# Patient Record
Sex: FEMALE | Race: WHITE | NOT HISPANIC OR LATINO | Employment: FULL TIME | URBAN - METROPOLITAN AREA
[De-identification: names, ages, dates, MRNs, and addresses within clinical notes are randomized per-mention and may not be internally consistent; named-entity substitution may affect disease eponyms.]

---

## 2023-02-04 PROBLEM — E11.9 CONTROLLED TYPE 2 DIABETES MELLITUS (MULTI): Status: ACTIVE | Noted: 2023-02-04

## 2023-02-04 PROBLEM — G47.00 INSOMNIA: Status: ACTIVE | Noted: 2023-02-04

## 2023-02-04 PROBLEM — K14.0 GLOSSITIS: Status: ACTIVE | Noted: 2023-02-04

## 2023-02-04 PROBLEM — G62.9 NEUROPATHY: Status: ACTIVE | Noted: 2023-02-04

## 2023-02-04 PROBLEM — N39.0 UTI (URINARY TRACT INFECTION): Status: ACTIVE | Noted: 2023-02-04

## 2023-02-04 PROBLEM — R78.81 BACTEREMIA: Status: ACTIVE | Noted: 2023-02-04

## 2023-02-04 PROBLEM — I10 HYPERTENSION: Status: ACTIVE | Noted: 2023-02-04

## 2023-02-04 PROBLEM — N92.6 MISSED PERIODS: Status: ACTIVE | Noted: 2023-02-04

## 2023-02-04 PROBLEM — R10.31 ABDOMINAL PAIN, RIGHT LOWER QUADRANT: Status: ACTIVE | Noted: 2023-02-04

## 2023-02-04 PROBLEM — E78.5 HYPERLIPIDEMIA: Status: ACTIVE | Noted: 2023-02-04

## 2023-02-04 PROBLEM — F41.9 ANXIETY: Status: ACTIVE | Noted: 2023-02-04

## 2023-02-04 PROBLEM — R51.9 FREQUENT HEADACHES: Status: ACTIVE | Noted: 2023-02-04

## 2023-02-04 PROBLEM — B37.31 YEAST VAGINITIS: Status: ACTIVE | Noted: 2023-02-04

## 2023-02-04 PROBLEM — G43.909 MIGRAINE, UNSPECIFIED, NOT INTRACTABLE, WITHOUT STATUS MIGRAINOSUS: Status: ACTIVE | Noted: 2023-02-04

## 2023-02-04 PROBLEM — N20.0 KIDNEY STONE ON LEFT SIDE: Status: ACTIVE | Noted: 2023-02-04

## 2023-02-04 PROBLEM — R07.9 CHEST PAIN: Status: ACTIVE | Noted: 2023-02-04

## 2023-02-04 PROBLEM — R11.0 NAUSEA IN ADULT: Status: ACTIVE | Noted: 2023-02-04

## 2023-02-04 PROBLEM — R10.9 ACUTE RIGHT FLANK PAIN: Status: ACTIVE | Noted: 2023-02-04

## 2023-02-04 PROBLEM — F32.A DEPRESSION: Status: ACTIVE | Noted: 2023-02-04

## 2023-02-04 PROBLEM — R30.0 DYSURIA: Status: ACTIVE | Noted: 2023-02-04

## 2023-02-04 RX ORDER — ONDANSETRON HYDROCHLORIDE 8 MG/1
1 TABLET, FILM COATED ORAL EVERY 8 HOURS PRN
COMMUNITY
Start: 2022-04-08

## 2023-02-04 RX ORDER — LOSARTAN POTASSIUM 25 MG/1
1 TABLET ORAL DAILY
COMMUNITY
Start: 2022-09-13

## 2023-02-04 RX ORDER — DICLOFENAC SODIUM 75 MG/1
1 TABLET, DELAYED RELEASE ORAL 2 TIMES DAILY PRN
COMMUNITY
Start: 2022-10-18

## 2023-02-04 RX ORDER — ATORVASTATIN CALCIUM 10 MG/1
1 TABLET, FILM COATED ORAL DAILY
COMMUNITY
Start: 2022-09-14 | End: 2023-04-03 | Stop reason: ALTCHOICE

## 2023-02-04 RX ORDER — NAPROXEN 500 MG/1
1 TABLET ORAL 2 TIMES DAILY PRN
COMMUNITY
Start: 2022-04-08

## 2023-02-04 RX ORDER — TAMSULOSIN HYDROCHLORIDE 0.4 MG/1
1 CAPSULE ORAL DAILY
COMMUNITY
Start: 2022-10-29

## 2023-02-04 RX ORDER — DULAGLUTIDE 1.5 MG/.5ML
1.5 INJECTION, SOLUTION SUBCUTANEOUS
COMMUNITY
Start: 2022-10-11 | End: 2023-09-25 | Stop reason: SDUPTHER

## 2023-02-04 RX ORDER — GABAPENTIN 300 MG/1
1 CAPSULE ORAL NIGHTLY
COMMUNITY
Start: 2022-09-13 | End: 2023-04-03 | Stop reason: ALTCHOICE

## 2023-02-04 RX ORDER — AMITRIPTYLINE HYDROCHLORIDE 10 MG/1
1 TABLET, FILM COATED ORAL DAILY
COMMUNITY
Start: 2022-11-04

## 2023-02-04 RX ORDER — PHENAZOPYRIDINE HYDROCHLORIDE 100 MG/1
1 TABLET, FILM COATED ORAL 2 TIMES DAILY
COMMUNITY
Start: 2022-10-29

## 2023-02-04 RX ORDER — HYDROXYCHLOROQUINE SULFATE 200 MG/1
1 TABLET, FILM COATED ORAL 2 TIMES DAILY
COMMUNITY
Start: 2022-10-05

## 2023-04-03 ENCOUNTER — LAB (OUTPATIENT)
Dept: LAB | Facility: LAB | Age: 48
End: 2023-04-03
Payer: COMMERCIAL

## 2023-04-03 ENCOUNTER — OFFICE VISIT (OUTPATIENT)
Dept: PRIMARY CARE | Facility: CLINIC | Age: 48
End: 2023-04-03
Payer: COMMERCIAL

## 2023-04-03 VITALS
HEIGHT: 66 IN | SYSTOLIC BLOOD PRESSURE: 124 MMHG | DIASTOLIC BLOOD PRESSURE: 84 MMHG | BODY MASS INDEX: 34.72 KG/M2 | WEIGHT: 216 LBS | HEART RATE: 98 BPM | OXYGEN SATURATION: 99 %

## 2023-04-03 DIAGNOSIS — I10 HYPERTENSION, UNSPECIFIED TYPE: ICD-10-CM

## 2023-04-03 DIAGNOSIS — E78.5 HYPERLIPIDEMIA, UNSPECIFIED HYPERLIPIDEMIA TYPE: ICD-10-CM

## 2023-04-03 DIAGNOSIS — E11.9 CONTROLLED TYPE 2 DIABETES MELLITUS WITHOUT COMPLICATION, WITHOUT LONG-TERM CURRENT USE OF INSULIN (MULTI): Primary | ICD-10-CM

## 2023-04-03 DIAGNOSIS — K75.81 NASH (NONALCOHOLIC STEATOHEPATITIS): ICD-10-CM

## 2023-04-03 PROBLEM — G43.909 MIGRAINE, UNSPECIFIED, NOT INTRACTABLE, WITHOUT STATUS MIGRAINOSUS: Status: RESOLVED | Noted: 2023-02-04 | Resolved: 2023-04-03

## 2023-04-03 PROBLEM — F41.9 ANXIETY: Status: RESOLVED | Noted: 2023-02-04 | Resolved: 2023-04-03

## 2023-04-03 PROBLEM — K14.0 GLOSSITIS: Status: RESOLVED | Noted: 2023-02-04 | Resolved: 2023-04-03

## 2023-04-03 PROBLEM — R51.9 FREQUENT HEADACHES: Status: RESOLVED | Noted: 2023-02-04 | Resolved: 2023-04-03

## 2023-04-03 PROBLEM — F32.A DEPRESSION: Status: RESOLVED | Noted: 2023-02-04 | Resolved: 2023-04-03

## 2023-04-03 PROBLEM — R30.0 DYSURIA: Status: RESOLVED | Noted: 2023-02-04 | Resolved: 2023-04-03

## 2023-04-03 PROBLEM — R11.0 NAUSEA IN ADULT: Status: RESOLVED | Noted: 2023-02-04 | Resolved: 2023-04-03

## 2023-04-03 PROBLEM — R78.81 BACTEREMIA: Status: RESOLVED | Noted: 2023-02-04 | Resolved: 2023-04-03

## 2023-04-03 PROBLEM — R07.9 CHEST PAIN: Status: RESOLVED | Noted: 2023-02-04 | Resolved: 2023-04-03

## 2023-04-03 PROBLEM — R10.31 ABDOMINAL PAIN, RIGHT LOWER QUADRANT: Status: RESOLVED | Noted: 2023-02-04 | Resolved: 2023-04-03

## 2023-04-03 PROBLEM — N92.6 MISSED PERIODS: Status: RESOLVED | Noted: 2023-02-04 | Resolved: 2023-04-03

## 2023-04-03 PROBLEM — R10.9 ACUTE RIGHT FLANK PAIN: Status: RESOLVED | Noted: 2023-02-04 | Resolved: 2023-04-03

## 2023-04-03 PROBLEM — G62.9 NEUROPATHY: Status: RESOLVED | Noted: 2023-02-04 | Resolved: 2023-04-03

## 2023-04-03 LAB
ALANINE AMINOTRANSFERASE (SGPT) (U/L) IN SER/PLAS: 5 U/L (ref 7–45)
ALBUMIN (G/DL) IN SER/PLAS: 4.2 G/DL (ref 3.4–5)
ALKALINE PHOSPHATASE (U/L) IN SER/PLAS: 73 U/L (ref 33–110)
ANION GAP IN SER/PLAS: 13 MMOL/L (ref 10–20)
ASPARTATE AMINOTRANSFERASE (SGOT) (U/L) IN SER/PLAS: 10 U/L (ref 9–39)
BILIRUBIN TOTAL (MG/DL) IN SER/PLAS: 0.6 MG/DL (ref 0–1.2)
CALCIUM (MG/DL) IN SER/PLAS: 9.7 MG/DL (ref 8.6–10.6)
CARBON DIOXIDE, TOTAL (MMOL/L) IN SER/PLAS: 28 MMOL/L (ref 21–32)
CHLORIDE (MMOL/L) IN SER/PLAS: 103 MMOL/L (ref 98–107)
CREATININE (MG/DL) IN SER/PLAS: 0.89 MG/DL (ref 0.5–1.05)
ERYTHROCYTE DISTRIBUTION WIDTH (RATIO) BY AUTOMATED COUNT: 13.1 % (ref 11.5–14.5)
ERYTHROCYTE MEAN CORPUSCULAR HEMOGLOBIN CONCENTRATION (G/DL) BY AUTOMATED: 34.1 G/DL (ref 32–36)
ERYTHROCYTE MEAN CORPUSCULAR VOLUME (FL) BY AUTOMATED COUNT: 93 FL (ref 80–100)
ERYTHROCYTES (10*6/UL) IN BLOOD BY AUTOMATED COUNT: 4.56 X10E12/L (ref 4–5.2)
GFR FEMALE: 80 ML/MIN/1.73M2
GLUCOSE (MG/DL) IN SER/PLAS: 140 MG/DL (ref 74–99)
HEMATOCRIT (%) IN BLOOD BY AUTOMATED COUNT: 42.2 % (ref 36–46)
HEMOGLOBIN (G/DL) IN BLOOD: 14.4 G/DL (ref 12–16)
LEUKOCYTES (10*3/UL) IN BLOOD BY AUTOMATED COUNT: 8.1 X10E9/L (ref 4.4–11.3)
NRBC (PER 100 WBCS) BY AUTOMATED COUNT: 0 /100 WBC (ref 0–0)
PLATELETS (10*3/UL) IN BLOOD AUTOMATED COUNT: 330 X10E9/L (ref 150–450)
POC HEMOGLOBIN A1C: 6.1 % (ref 4.2–6.5)
POTASSIUM (MMOL/L) IN SER/PLAS: 4.1 MMOL/L (ref 3.5–5.3)
PROTEIN TOTAL: 7.3 G/DL (ref 6.4–8.2)
SODIUM (MMOL/L) IN SER/PLAS: 140 MMOL/L (ref 136–145)
UREA NITROGEN (MG/DL) IN SER/PLAS: 17 MG/DL (ref 6–23)

## 2023-04-03 PROCEDURE — 4010F ACE/ARB THERAPY RXD/TAKEN: CPT | Performed by: INTERNAL MEDICINE

## 2023-04-03 PROCEDURE — 3079F DIAST BP 80-89 MM HG: CPT | Performed by: INTERNAL MEDICINE

## 2023-04-03 PROCEDURE — 83036 HEMOGLOBIN GLYCOSYLATED A1C: CPT | Performed by: INTERNAL MEDICINE

## 2023-04-03 PROCEDURE — 3074F SYST BP LT 130 MM HG: CPT | Performed by: INTERNAL MEDICINE

## 2023-04-03 PROCEDURE — 80053 COMPREHEN METABOLIC PANEL: CPT

## 2023-04-03 PROCEDURE — 85027 COMPLETE CBC AUTOMATED: CPT

## 2023-04-03 PROCEDURE — 99214 OFFICE O/P EST MOD 30 MIN: CPT | Performed by: INTERNAL MEDICINE

## 2023-04-03 PROCEDURE — 36415 COLL VENOUS BLD VENIPUNCTURE: CPT

## 2023-04-03 RX ORDER — ATORVASTATIN CALCIUM 10 MG/1
10 TABLET, FILM COATED ORAL DAILY
Qty: 30 TABLET | Refills: 5 | Status: SHIPPED | OUTPATIENT
Start: 2023-04-03 | End: 2023-09-30

## 2023-04-03 NOTE — PROGRESS NOTES
"Subjective   Patient ID: Jazlyn Dean is a 47 y.o. female who presents for the following      Physical June 28, 2022  Assessment/Plan   HX OF j-j stent placement with stone removal, Dr Asim GOULD: check cmp    Vertigo: recommend PT in the future    Diabetes: a1c  6.1 4/3/23  trulicity doing well. (Metformin caused confusion and sleeping)    Htn: stable    HLD: unstable, add atorvastatin     Hx uterine cancer stage 1: resolved, follows with dr kent         HPI  female here for the following      GOULD: GI felt during October 2022 hospitalization that her elevated LFTS were secondary to GOULD.       Diabetes Type 2: patient denies any low blood sugars. Patient is following with opthalmology on a yearly basis. Patient is taking nothing at this time      metformin caused GI upset and diarrhea     patient is tolerating trulicity doing well now     multijoint pain: both sides of the body. its been present for a year. patient tried tyelnol and ibuprofen and it did not go away. she says that she is exhausted. specifically to her hands the whole hand aches but no particular joint. she says that she says that its tingeling that is driving her crazy and does not stop .she says its a \"deep bone pain\"     HTN: patient denies any headaches, blurred vision or dizziness. patient denies any stroke like symptoms     HLD: she did not start statin      hx uterine cancer: stage 1, with Dr Brii Kent had total hysterectomy august 2021. she was lost to oncology follow up      patient was without insurance for a while      current smoker      HOSPITALIZATIONS  patient was discharge october 23, 2022 from [facility] for left-sided flank pain started approximately 2 days ago. Patient says she was doing all right over the weekend up until this morning. Patient says that she started to feel short of breath pain seem to have worsened and she just did not feel right. So she went to the emergency department for further assessment. She is " "becoming more more weak. Patient is noted to have creatinine of 1.7 upon admission a white count of 15.6K with shift, significant urinary tract infections with blood cultures positive with gram-negative rods. CT abdomen pelvis shows mild left hydroureteronephrosis 3mm in the distal left ureter . Given patient's low blood pressures and clinical course urology had a double-J stent placed Patient did leave the hospital AGAINST MEDICAL ADVICE. Her kidney function was rising and she had electrolyte abnormalities in the setting of bacteremia with cultures that were not yet with susceptibilities. I did call her and Omnicef      patient then did come back to the hospital and she was then readmitted october 26, 2022. her urine cultures grew out klebsiella. Patient was discharged on another 10 days of cipro.     Visit Vitals  /84   Pulse 98   Ht 1.676 m (5' 6\")   Wt 98 kg (216 lb)   SpO2 99%   BMI 34.86 kg/m²   Smoking Status Every Day   BSA 2.14 m²     PHYSICAL EXAM   General appearance: Alert and in no acute distress. speech is clear and coherent  HEENT: Sclera and conjunctiva white, EOMI, uvela midline, no mouth lesions. PERRLA,  nasal turbinates are not swollen without exudate. TM's Booth with cone of light, external ear canal with scant cerumen. No head trauma  Neck: no carotid bruits or thyromegaly. no lymphadenopathy   Respiratory : No respiratory distress, normal respiratory rhythm and effort. Clear bilateral breath sounds. No wheezing or rhonchi.   Cardiovascular: heart rate regular, S1, S2. no murmurs. no Lower extremity edema  Skin inspection: Normal skin color and pigmentation, normal skin turgor and no visible rash, induration, or cellulitis  MSK: 5/5 strength upper and lower extremities without gait abnormalities. no loss of muscle mass   Neuro: 2-12 CN grossly intact.  no slurred speech. no lateralizing deficit  Psychiatric Orientation: Oriented to person, place, and time. no depression, homicidal or " suicidal thoughts, normal affect  Abdomen: soft, none tender, none distended. no organomegaly      REVIEW OF SYSTEMS     Constitutional: not feeling tired and no fever, chills or sweats. Denies weight loss    HEENT: no earache and no sore throat. no blurred vision and or double vision. no headache  Cardiovascular: no exertional chest pain, no palpitations, no lower extremity edema and no intermittent leg claudication.   Lungs: Denies shortness of breath, exertional dyspnea, wheezing  Gastrointestinal: no change in bowel habits, no diarrhea, no nausea, no vomiting and no abdominal pain. Denies Melena, brbpr or dark stool  Musculoskeletal: no myalgias, no muscle weakness and no limb swelling.   Skin: no rashes, no change in skin color and pigmentation and no skin lumps.   Neurological: no headaches, no seizures, no numbness, no lateralizing deficits and no fainting.   Psychiatric: no depression and no anxiety.   Urine: denies polyuria, hematuria, dysuria   Endocrine: no cold intolerance, no heat intolerance    Allergies   Allergen Reactions    Aspirin Unknown    Latex Unknown    Metformin Unknown       Current Outpatient Medications   Medication Sig Dispense Refill    amitriptyline (Elavil) 10 mg tablet Take 1 tablet (10 mg) by mouth 1 (one) time each day.      atorvastatin (Lipitor) 10 mg tablet Take 1 tablet (10 mg) by mouth in the morning.      diclofenac (Voltaren) 75 mg EC tablet Take 1 tablet (75 mg) by mouth if needed in the morning and at bedtime.      dulaglutide (Trulicity) 1.5 mg/0.5 mL pen injector Inject 1.5 mg under the skin 1 (one) time per week.      gabapentin (Neurontin) 300 mg capsule Take 1 capsule (300 mg) by mouth at bedtime.      hydroxychloroquine (Plaquenil) 200 mg tablet Take 1 tablet (200 mg) by mouth in the morning and at bedtime.      losartan (Cozaar) 25 mg tablet Take 1 tablet (25 mg) by mouth in the morning.      naproxen (Naprosyn) 500 mg tablet Take 1 tablet (500 mg) by mouth if  needed in the morning and at bedtime.      ondansetron (Zofran) 8 mg tablet Take 1 tablet (8 mg) by mouth every 8 (eight) hours if needed for nausea.      phenazopyridine (Pyridium) 100 mg tablet Take 1 tablet (100 mg) by mouth in the morning and at bedtime.      tamsulosin (Flomax) 0.4 mg 24 hr capsule Take 1 capsule (0.4 mg) by mouth in the morning.       No current facility-administered medications for this visit.       Objective     Legacy Encounter on 01/09/2023   Component Date Value Ref Range Status    Cholesterol 01/09/2023 276 (H)  0 - 199 mg/dL Final    HDL 01/09/2023 45.8  mg/dL Final    Cholesterol/HDL Ratio 01/09/2023 6.0 (A)   Final    LDL 01/09/2023 181 (H)  0 - 99 mg/dL Final    VLDL 01/09/2023 49 (H)  0 - 40 mg/dL Final    Triglycerides 01/09/2023 244 (H)  0 - 149 mg/dL Final    Non HDL Cholesterol 01/09/2023 230  mg/dL Final    WBC 01/09/2023 5.4  4.4 - 11.3 x10E9/L Final    nRBC 01/09/2023 0.0  0.0 - 0.0 /100 WBC Final    RBC 01/09/2023 4.67  4.00 - 5.20 x10E12/L Final    Hemoglobin 01/09/2023 14.2  12.0 - 16.0 g/dL Final    Hematocrit 01/09/2023 41.6  36.0 - 46.0 % Final    MCV 01/09/2023 89  80 - 100 fL Final    MCHC 01/09/2023 34.1  32.0 - 36.0 g/dL Final    Platelets 01/09/2023 307  150 - 450 x10E9/L Final    RDW 01/09/2023 13.1  11.5 - 14.5 % Final    Neutrophils % 01/09/2023 61.6  40.0 - 80.0 % Final    Immature Granulocytes %, Automated 01/09/2023 0.2  0.0 - 0.9 % Final    Lymphocytes % 01/09/2023 28.1  13.0 - 44.0 % Final    Monocytes % 01/09/2023 7.2  2.0 - 10.0 % Final    Eosinophils % 01/09/2023 2.2  0.0 - 6.0 % Final    Basophils % 01/09/2023 0.7  0.0 - 2.0 % Final    Neutrophils Absolute 01/09/2023 3.31  1.20 - 7.70 x10E9/L Final    Lymphocytes Absolute 01/09/2023 1.51  1.20 - 4.80 x10E9/L Final    Monocytes Absolute 01/09/2023 0.39  0.10 - 1.00 x10E9/L Final    Eosinophils Absolute 01/09/2023 0.12  0.00 - 0.70 x10E9/L Final    Basophils Absolute 01/09/2023 0.04  0.00 - 0.10 x10E9/L  Final    Glucose 01/09/2023 145 (H)  74 - 99 mg/dL Final    Sodium 01/09/2023 139  136 - 145 mmol/L Final    Potassium 01/09/2023 4.2  3.5 - 5.3 mmol/L Final    Chloride 01/09/2023 102  98 - 107 mmol/L Final    Bicarbonate 01/09/2023 25  21 - 32 mmol/L Final    Anion Gap 01/09/2023 16  10 - 20 mmol/L Final    Urea Nitrogen 01/09/2023 12  6 - 23 mg/dL Final    Creatinine 01/09/2023 0.98  0.50 - 1.05 mg/dL Final    GFR Female 01/09/2023 71  >90 mL/min/1.73m2 Final    Calcium 01/09/2023 9.9  8.6 - 10.6 mg/dL Final       Radiology: Reviewed imaging in powerchart.  No results found.    Family History   Problem Relation Name Age of Onset    Other (cardiac problem) Mother      Other (diabetes mellitus) Mother       Social History     Socioeconomic History    Marital status: Single     Spouse name: None    Number of children: None    Years of education: None    Highest education level: None   Occupational History    None   Tobacco Use    Smoking status: Every Day     Types: Cigarettes    Smokeless tobacco: Never   Vaping Use    Vaping status: None   Substance and Sexual Activity    Alcohol use: None    Drug use: None    Sexual activity: None   Other Topics Concern    None   Social History Narrative    None     Social Determinants of Health     Financial Resource Strain: Not on file   Food Insecurity: Not on file   Transportation Needs: Not on file   Physical Activity: Not on file   Stress: Not on file   Social Connections: Not on file   Intimate Partner Violence: Not on file   Housing Stability: Not on file     Past Medical History:   Diagnosis Date    Acute tonsillitis, unspecified 12/12/2016    Acute tonsillitis    Impacted cerumen, left ear 08/30/2016    Impacted cerumen of left ear    Personal history of other diseases of the nervous system and sense organs     History of migraine    Personal history of other diseases of the respiratory system     History of bronchitis    Personal history of other diseases of the  respiratory system 10/03/2016    History of acute pharyngitis    Personal history of other diseases of the respiratory system 08/30/2016    History of acute pharyngitis    Personal history of other infectious and parasitic diseases     History of varicella    Personal history of other specified conditions     History of insomnia    Personal history of other specified conditions 11/11/2022    History of chest pain    Pneumonia, unspecified organism     Walking pneumonia     Past Surgical History:   Procedure Laterality Date    BACK SURGERY  02/12/2015    Back Surgery    MOUTH SURGERY  02/12/2015    Oral Surgery Tooth Extraction       Charting was completed using voice recognition technology and may include unintended errors.

## 2023-09-25 DIAGNOSIS — E11.8 CONTROLLED DIABETES MELLITUS TYPE 2 WITH COMPLICATIONS, UNSPECIFIED WHETHER LONG TERM INSULIN USE (MULTI): ICD-10-CM

## 2023-09-25 RX ORDER — DULAGLUTIDE 1.5 MG/.5ML
1.5 INJECTION, SOLUTION SUBCUTANEOUS
Qty: 2 ML | Refills: 3 | Status: SHIPPED | OUTPATIENT
Start: 2023-09-25 | End: 2024-01-31 | Stop reason: SDUPTHER

## 2023-10-03 ENCOUNTER — LAB (OUTPATIENT)
Dept: LAB | Facility: LAB | Age: 48
End: 2023-10-03
Payer: COMMERCIAL

## 2023-10-03 ENCOUNTER — OFFICE VISIT (OUTPATIENT)
Dept: PRIMARY CARE | Facility: CLINIC | Age: 48
End: 2023-10-03
Payer: COMMERCIAL

## 2023-10-03 VITALS
HEART RATE: 105 BPM | BODY MASS INDEX: 38.89 KG/M2 | DIASTOLIC BLOOD PRESSURE: 68 MMHG | OXYGEN SATURATION: 99 % | HEIGHT: 66 IN | SYSTOLIC BLOOD PRESSURE: 102 MMHG | WEIGHT: 242 LBS | TEMPERATURE: 96.9 F

## 2023-10-03 DIAGNOSIS — Z00.00 ANNUAL PHYSICAL EXAM: Primary | ICD-10-CM

## 2023-10-03 DIAGNOSIS — M19.90 ARTHRITIS: ICD-10-CM

## 2023-10-03 DIAGNOSIS — Z00.00 ANNUAL PHYSICAL EXAM: ICD-10-CM

## 2023-10-03 DIAGNOSIS — E11.8 CONTROLLED TYPE 2 DIABETES MELLITUS WITH COMPLICATION, WITHOUT LONG-TERM CURRENT USE OF INSULIN (MULTI): ICD-10-CM

## 2023-10-03 LAB
25(OH)D3 SERPL-MCNC: 32 NG/ML (ref 30–100)
ERYTHROCYTE [DISTWIDTH] IN BLOOD BY AUTOMATED COUNT: 13.4 % (ref 11.5–14.5)
HCT VFR BLD AUTO: 43.3 % (ref 36–46)
HGB BLD-MCNC: 14 G/DL (ref 12–16)
MCH RBC QN AUTO: 32.3 PG (ref 26–34)
MCHC RBC AUTO-ENTMCNC: 32.3 G/DL (ref 32–36)
MCV RBC AUTO: 100 FL (ref 80–100)
NRBC BLD-RTO: 0 /100 WBCS (ref 0–0)
PLATELET # BLD AUTO: 311 X10*3/UL (ref 150–450)
PMV BLD AUTO: 9 FL (ref 7.5–11.5)
POC HEMOGLOBIN A1C: 6.4 % (ref 4.2–6.5)
RBC # BLD AUTO: 4.33 X10*6/UL (ref 4–5.2)
TSH SERPL-ACNC: 2.66 MIU/L (ref 0.44–3.98)
WBC # BLD AUTO: 8.3 X10*3/UL (ref 4.4–11.3)

## 2023-10-03 PROCEDURE — 99396 PREV VISIT EST AGE 40-64: CPT | Performed by: INTERNAL MEDICINE

## 2023-10-03 PROCEDURE — 3074F SYST BP LT 130 MM HG: CPT | Performed by: INTERNAL MEDICINE

## 2023-10-03 PROCEDURE — 4010F ACE/ARB THERAPY RXD/TAKEN: CPT | Performed by: INTERNAL MEDICINE

## 2023-10-03 PROCEDURE — 83036 HEMOGLOBIN GLYCOSYLATED A1C: CPT | Performed by: INTERNAL MEDICINE

## 2023-10-03 PROCEDURE — 3078F DIAST BP <80 MM HG: CPT | Performed by: INTERNAL MEDICINE

## 2023-10-03 PROCEDURE — 96372 THER/PROPH/DIAG INJ SC/IM: CPT | Performed by: INTERNAL MEDICINE

## 2023-10-03 PROCEDURE — 36415 COLL VENOUS BLD VENIPUNCTURE: CPT

## 2023-10-03 RX ORDER — METHYLPREDNISOLONE ACETATE 40 MG/ML
40 INJECTION, SUSPENSION INTRA-ARTICULAR; INTRALESIONAL; INTRAMUSCULAR; SOFT TISSUE ONCE
Status: COMPLETED | OUTPATIENT
Start: 2023-10-03 | End: 2023-10-03

## 2023-10-03 RX ORDER — FOLIC ACID 1 MG/1
TABLET ORAL DAILY
COMMUNITY

## 2023-10-03 RX ADMIN — METHYLPREDNISOLONE ACETATE 40 MG: 40 INJECTION, SUSPENSION INTRA-ARTICULAR; INTRALESIONAL; INTRAMUSCULAR; SOFT TISSUE at 09:00

## 2023-10-03 NOTE — PROGRESS NOTES
Subjective   Patient ID: Jazlyn Dean is a 47 y.o. female who presents for the following    Physical 10/3/2023    Assessment/Plan   HX OF j-j stent placement with stone removal, Dr Asim GOULD: check cmp    Vertigo: recommend PT in the future    Diabetes: a1c  6.1 4/3/23 --> 6.4  10/3/23  Continue on  trulicity doing well.   (Metformin caused confusion and sleeping)    Htn: stable    HLD: stable, continue on atorvastatin     Hx uterine cancer stage 1: resolved, follows with dr brooks     Spinal stenosis s/p surgery follows with dr benson. Lots of pain. Refer to pain management   40mg depot medrol IM x 1    Rheumatoid arthritis: patient following with dr moore. Recommend to look into enbrel     Mammogram March 2023 negative    Defers all colon cancer screening         HPI  female here for the following      GOULD: GI felt during October 2022 hospitalization that her elevated LFTS were secondary to GOULD.       Diabetes Type 2: patient denies any low blood sugars. Patient is following with opthalmology on a yearly basis. Patient is taking nothing at this time      metformin caused GI upset and diarrhea     patient is tolerating trulicity doing well now     Rheumatoid arthritis: multi joint pain:      HTN: patient denies any headaches, blurred vision or dizziness. patient denies any stroke like symptoms     HLD: she did not start statin      hx uterine cancer: stage 1, with Dr Brii Brooks had total hysterectomy august 2021. she was lost to oncology follow up      patient was without insurance for a while      current smoker      HOSPITALIZATIONS  patient was discharge october 23, 2022 from [facility] for left-sided flank pain started approximately 2 days ago. Patient says she was doing all right over the weekend up until this morning. Patient says that she started to feel short of breath pain seem to have worsened and she just did not feel right. So she went to the emergency department for further assessment. She is  "becoming more more weak. Patient is noted to have creatinine of 1.7 upon admission a white count of 15.6K with shift, significant urinary tract infections with blood cultures positive with gram-negative rods. CT abdomen pelvis shows mild left hydroureteronephrosis 3mm in the distal left ureter . Given patient's low blood pressures and clinical course urology had a double-J stent placed Patient did leave the hospital AGAINST MEDICAL ADVICE. Her kidney function was rising and she had electrolyte abnormalities in the setting of bacteremia with cultures that were not yet with susceptibilities. I did call her and Omnicef      patient then did come back to the hospital and she was then readmitted october 26, 2022. her urine cultures grew out klebsiella. Patient was discharged on another 10 days of cipro.     Visit Vitals  /68 (BP Location: Right arm, Patient Position: Sitting)   Pulse 105   Temp 36.1 °C (96.9 °F)   Ht 1.676 m (5' 6\")   Wt 110 kg (242 lb)   SpO2 99%   BMI 39.06 kg/m²   Smoking Status Every Day   BSA 2.26 m²     PHYSICAL EXAM   General appearance: Alert and in no acute distress. speech is clear and coherent  HEENT: Sclera and conjunctiva white, EOMI, uvela midline, no mouth lesions. PERRLA,  nasal turbinates are not swollen without exudate. TM's Booth with cone of light, external ear canal with scant cerumen. No head trauma  Neck: no carotid bruits or thyromegaly. no lymphadenopathy   Respiratory : No respiratory distress, normal respiratory rhythm and effort. Clear bilateral breath sounds. No wheezing or rhonchi.   Cardiovascular: heart rate regular, S1, S2. no murmurs. no Lower extremity edema  Skin inspection: Normal skin color and pigmentation, normal skin turgor and no visible rash, induration, or cellulitis  MSK: 5/5 strength upper and lower extremities without gait abnormalities. no loss of muscle mass   Neuro: 2-12 CN grossly intact.  no slurred speech. no lateralizing deficit  Psychiatric " Orientation: Oriented to person, place, and time. no depression, homicidal or suicidal thoughts, normal affect  Abdomen: soft, none tender, none distended. no organomegaly      REVIEW OF SYSTEMS     Constitutional: not feeling tired and no fever, chills or sweats. Denies weight loss    HEENT: no earache and no sore throat. no blurred vision and or double vision. no headache  Cardiovascular: no exertional chest pain, no palpitations, no lower extremity edema and no intermittent leg claudication.   Lungs: Denies shortness of breath, exertional dyspnea, wheezing  Gastrointestinal: no change in bowel habits, no diarrhea, no nausea, no vomiting and no abdominal pain. Denies Melena, brbpr or dark stool  Musculoskeletal: no myalgias, no muscle weakness and no limb swelling.   Skin: no rashes, no change in skin color and pigmentation and no skin lumps.   Neurological: no headaches, no seizures, no numbness, no lateralizing deficits and no fainting.   Psychiatric: no depression and no anxiety.   Urine: denies polyuria, hematuria, dysuria   Endocrine: no cold intolerance, no heat intolerance    Allergies   Allergen Reactions    Aspirin Unknown    Latex Unknown    Metformin Unknown       Current Outpatient Medications   Medication Sig Dispense Refill    amitriptyline (Elavil) 10 mg tablet Take 1 tablet (10 mg) by mouth once daily.      diclofenac (Voltaren) 75 mg EC tablet Take 1 tablet (75 mg) by mouth 2 times a day as needed.      dulaglutide (Trulicity) 1.5 mg/0.5 mL pen injector injection Inject 1.5 mg under the skin 1 (one) time per week. 2 mL 3    folic acid (Folvite) 1 mg tablet Take by mouth once daily.      hydroxychloroquine (Plaquenil) 200 mg tablet Take 1 tablet (200 mg) by mouth 2 times a day.      losartan (Cozaar) 25 mg tablet Take 1 tablet (25 mg) by mouth once daily.      naproxen (Naprosyn) 500 mg tablet Take 1 tablet (500 mg) by mouth 2 times a day as needed.      ondansetron (Zofran) 8 mg tablet Take 1  tablet (8 mg) by mouth every 8 hours if needed for nausea.      phenazopyridine (Pyridium) 100 mg tablet Take 1 tablet (100 mg) by mouth 2 times a day.      tamsulosin (Flomax) 0.4 mg 24 hr capsule Take 1 capsule (0.4 mg) by mouth once daily.      atorvastatin (Lipitor) 10 mg tablet Take 1 tablet (10 mg) by mouth once daily. 30 tablet 5     No current facility-administered medications for this visit.       Objective     No visits with results within 4 Month(s) from this visit.   Latest known visit with results is:   Lab on 04/03/2023   Component Date Value Ref Range Status    WBC 04/03/2023 8.1  4.4 - 11.3 x10E9/L Final    nRBC 04/03/2023 0.0  0.0 - 0.0 /100 WBC Final    RBC 04/03/2023 4.56  4.00 - 5.20 x10E12/L Final    Hemoglobin 04/03/2023 14.4  12.0 - 16.0 g/dL Final    Hematocrit 04/03/2023 42.2  36.0 - 46.0 % Final    MCV 04/03/2023 93  80 - 100 fL Final    MCHC 04/03/2023 34.1  32.0 - 36.0 g/dL Final    Platelets 04/03/2023 330  150 - 450 x10E9/L Final    RDW 04/03/2023 13.1  11.5 - 14.5 % Final    Glucose 04/03/2023 140 (H)  74 - 99 mg/dL Final    Sodium 04/03/2023 140  136 - 145 mmol/L Final    Potassium 04/03/2023 4.1  3.5 - 5.3 mmol/L Final    Chloride 04/03/2023 103  98 - 107 mmol/L Final    Bicarbonate 04/03/2023 28  21 - 32 mmol/L Final    Anion Gap 04/03/2023 13  10 - 20 mmol/L Final    Urea Nitrogen 04/03/2023 17  6 - 23 mg/dL Final    Creatinine 04/03/2023 0.89  0.50 - 1.05 mg/dL Final    GFR Female 04/03/2023 80  >90 mL/min/1.73m2 Final    Calcium 04/03/2023 9.7  8.6 - 10.6 mg/dL Final    Albumin 04/03/2023 4.2  3.4 - 5.0 g/dL Final    Alkaline Phosphatase 04/03/2023 73  33 - 110 U/L Final    Total Protein 04/03/2023 7.3  6.4 - 8.2 g/dL Final    AST 04/03/2023 10  9 - 39 U/L Final    Total Bilirubin 04/03/2023 0.6  0.0 - 1.2 mg/dL Final    ALT (SGPT) 04/03/2023 5 (L)  7 - 45 U/L Final       Radiology: Reviewed imaging in powerchart.  No results found.    Family History   Problem Relation Name Age  of Onset    Other (cardiac problem) Mother      Other (diabetes mellitus) Mother       Social History     Socioeconomic History    Marital status: Single     Spouse name: None    Number of children: None    Years of education: None    Highest education level: None   Occupational History    None   Tobacco Use    Smoking status: Every Day     Packs/day: .25     Types: Cigarettes    Smokeless tobacco: Never   Substance and Sexual Activity    Alcohol use: Yes     Comment: occassionally    Drug use: Never    Sexual activity: None   Other Topics Concern    None   Social History Narrative    None     Social Determinants of Health     Financial Resource Strain: Not on file   Food Insecurity: Not on file   Transportation Needs: Not on file   Physical Activity: Not on file   Stress: Not on file   Social Connections: Not on file   Intimate Partner Violence: Not on file   Housing Stability: Not on file     Past Medical History:   Diagnosis Date    Acute tonsillitis, unspecified 12/12/2016    Acute tonsillitis    Impacted cerumen, left ear 08/30/2016    Impacted cerumen of left ear    Personal history of other diseases of the nervous system and sense organs     History of migraine    Personal history of other diseases of the respiratory system     History of bronchitis    Personal history of other diseases of the respiratory system 10/03/2016    History of acute pharyngitis    Personal history of other diseases of the respiratory system 08/30/2016    History of acute pharyngitis    Personal history of other infectious and parasitic diseases     History of varicella    Personal history of other specified conditions     History of insomnia    Personal history of other specified conditions 11/11/2022    History of chest pain    Pneumonia, unspecified organism     Walking pneumonia     Past Surgical History:   Procedure Laterality Date    BACK SURGERY  02/12/2015    Back Surgery    MOUTH SURGERY  02/12/2015    Oral Surgery Tooth  Extraction       Charting was completed using voice recognition technology and may include unintended errors.

## 2024-01-31 DIAGNOSIS — E11.8 CONTROLLED DIABETES MELLITUS TYPE 2 WITH COMPLICATIONS, UNSPECIFIED WHETHER LONG TERM INSULIN USE (MULTI): ICD-10-CM

## 2024-01-31 RX ORDER — DULAGLUTIDE 1.5 MG/.5ML
1.5 INJECTION, SOLUTION SUBCUTANEOUS
Qty: 2 ML | Refills: 3 | Status: SHIPPED | OUTPATIENT
Start: 2024-01-31 | End: 2024-05-30 | Stop reason: SDUPTHER

## 2024-03-01 ENCOUNTER — TELEPHONE (OUTPATIENT)
Dept: PRIMARY CARE | Facility: CLINIC | Age: 49
End: 2024-03-01
Payer: COMMERCIAL

## 2024-03-01 NOTE — TELEPHONE ENCOUNTER
PT said she had spoke to you and asked if she could come In at 5:00 on Monday?      She says that she had her Al-C checked in October and really only needs Dr to call her in the lower or higher dose of Trulicity.

## 2024-03-01 NOTE — TELEPHONE ENCOUNTER
Pharmacy's have been out of Trulicity 1.5 for 6 weeks and PT has not had in two weeks.    They have .75 available or 3.0      Please choose one and call in today so PT can .

## 2024-03-08 ENCOUNTER — TELEPHONE (OUTPATIENT)
Dept: PRIMARY CARE | Facility: CLINIC | Age: 49
End: 2024-03-08
Payer: COMMERCIAL

## 2024-03-08 NOTE — TELEPHONE ENCOUNTER
Pt is wondering if she can get the .75 of trulicity because her A1C has been at 6 consistently. She can't come in for an appt unless its at 5

## 2024-05-30 ENCOUNTER — TELEPHONE (OUTPATIENT)
Dept: PRIMARY CARE | Facility: CLINIC | Age: 49
End: 2024-05-30
Payer: COMMERCIAL

## 2024-05-30 DIAGNOSIS — E11.8 CONTROLLED DIABETES MELLITUS TYPE 2 WITH COMPLICATIONS, UNSPECIFIED WHETHER LONG TERM INSULIN USE (MULTI): ICD-10-CM

## 2024-05-31 RX ORDER — DULAGLUTIDE 1.5 MG/.5ML
1.5 INJECTION, SOLUTION SUBCUTANEOUS
Qty: 2 ML | Refills: 3 | Status: SHIPPED | OUTPATIENT
Start: 2024-06-02

## 2024-09-27 DIAGNOSIS — Z12.31 ENCOUNTER FOR SCREENING MAMMOGRAM FOR BREAST CANCER: ICD-10-CM

## 2024-10-07 ENCOUNTER — APPOINTMENT (OUTPATIENT)
Dept: PRIMARY CARE | Facility: CLINIC | Age: 49
End: 2024-10-07
Payer: COMMERCIAL

## 2024-10-07 ENCOUNTER — LAB (OUTPATIENT)
Dept: LAB | Facility: LAB | Age: 49
End: 2024-10-07
Payer: COMMERCIAL

## 2024-10-07 VITALS
DIASTOLIC BLOOD PRESSURE: 64 MMHG | HEART RATE: 107 BPM | HEIGHT: 66 IN | BODY MASS INDEX: 41.78 KG/M2 | OXYGEN SATURATION: 98 % | WEIGHT: 260 LBS | SYSTOLIC BLOOD PRESSURE: 110 MMHG

## 2024-10-07 DIAGNOSIS — Z00.00 ANNUAL PHYSICAL EXAM: ICD-10-CM

## 2024-10-07 DIAGNOSIS — R52 PAIN: ICD-10-CM

## 2024-10-07 DIAGNOSIS — I10 HYPERTENSION, UNSPECIFIED TYPE: ICD-10-CM

## 2024-10-07 DIAGNOSIS — Z00.00 ANNUAL PHYSICAL EXAM: Primary | ICD-10-CM

## 2024-10-07 DIAGNOSIS — E11.8 CONTROLLED DIABETES MELLITUS TYPE 2 WITH COMPLICATIONS, UNSPECIFIED WHETHER LONG TERM INSULIN USE (MULTI): ICD-10-CM

## 2024-10-07 DIAGNOSIS — E11.8 CONTROLLED TYPE 2 DIABETES MELLITUS WITH COMPLICATION, WITHOUT LONG-TERM CURRENT USE OF INSULIN (MULTI): ICD-10-CM

## 2024-10-07 DIAGNOSIS — E78.5 HYPERLIPIDEMIA, UNSPECIFIED HYPERLIPIDEMIA TYPE: ICD-10-CM

## 2024-10-07 LAB
25(OH)D3 SERPL-MCNC: 37 NG/ML (ref 30–100)
ALBUMIN SERPL BCP-MCNC: 4.2 G/DL (ref 3.4–5)
ALP SERPL-CCNC: 100 U/L (ref 33–110)
ALT SERPL W P-5'-P-CCNC: 17 U/L (ref 7–45)
ANION GAP SERPL CALC-SCNC: 17 MMOL/L (ref 10–20)
AST SERPL W P-5'-P-CCNC: 19 U/L (ref 9–39)
BILIRUB SERPL-MCNC: 0.6 MG/DL (ref 0–1.2)
BUN SERPL-MCNC: 17 MG/DL (ref 6–23)
CALCIUM SERPL-MCNC: 9.6 MG/DL (ref 8.6–10.6)
CHLORIDE SERPL-SCNC: 99 MMOL/L (ref 98–107)
CHOLEST SERPL-MCNC: 280 MG/DL (ref 0–199)
CHOLESTEROL/HDL RATIO: 5.7
CO2 SERPL-SCNC: 23 MMOL/L (ref 21–32)
CREAT SERPL-MCNC: 0.89 MG/DL (ref 0.5–1.05)
EGFRCR SERPLBLD CKD-EPI 2021: 80 ML/MIN/1.73M*2
ERYTHROCYTE [DISTWIDTH] IN BLOOD BY AUTOMATED COUNT: 12.1 % (ref 11.5–14.5)
GLUCOSE SERPL-MCNC: 346 MG/DL (ref 74–99)
HCT VFR BLD AUTO: 43.5 % (ref 36–46)
HDLC SERPL-MCNC: 49 MG/DL
HGB BLD-MCNC: 15.4 G/DL (ref 12–16)
LDLC SERPL CALC-MCNC: 178 MG/DL
MCH RBC QN AUTO: 31.2 PG (ref 26–34)
MCHC RBC AUTO-ENTMCNC: 35.4 G/DL (ref 32–36)
MCV RBC AUTO: 88 FL (ref 80–100)
NON HDL CHOLESTEROL: 231 MG/DL (ref 0–149)
NRBC BLD-RTO: 0 /100 WBCS (ref 0–0)
PLATELET # BLD AUTO: 300 X10*3/UL (ref 150–450)
POC HEMOGLOBIN A1C: 11.2 % (ref 4.2–6.5)
POTASSIUM SERPL-SCNC: 4.8 MMOL/L (ref 3.5–5.3)
PROT SERPL-MCNC: 7.6 G/DL (ref 6.4–8.2)
RBC # BLD AUTO: 4.94 X10*6/UL (ref 4–5.2)
SODIUM SERPL-SCNC: 134 MMOL/L (ref 136–145)
TRIGL SERPL-MCNC: 263 MG/DL (ref 0–149)
TSH SERPL-ACNC: 2.07 MIU/L (ref 0.44–3.98)
VLDL: 53 MG/DL (ref 0–40)
WBC # BLD AUTO: 6.5 X10*3/UL (ref 4.4–11.3)

## 2024-10-07 PROCEDURE — 80061 LIPID PANEL: CPT

## 2024-10-07 PROCEDURE — 80053 COMPREHEN METABOLIC PANEL: CPT

## 2024-10-07 PROCEDURE — 84443 ASSAY THYROID STIM HORMONE: CPT

## 2024-10-07 PROCEDURE — 36415 COLL VENOUS BLD VENIPUNCTURE: CPT

## 2024-10-07 PROCEDURE — 4010F ACE/ARB THERAPY RXD/TAKEN: CPT | Performed by: INTERNAL MEDICINE

## 2024-10-07 PROCEDURE — 99396 PREV VISIT EST AGE 40-64: CPT | Performed by: INTERNAL MEDICINE

## 2024-10-07 PROCEDURE — 82306 VITAMIN D 25 HYDROXY: CPT

## 2024-10-07 PROCEDURE — 3074F SYST BP LT 130 MM HG: CPT | Performed by: INTERNAL MEDICINE

## 2024-10-07 PROCEDURE — 93000 ELECTROCARDIOGRAM COMPLETE: CPT | Performed by: INTERNAL MEDICINE

## 2024-10-07 PROCEDURE — 3008F BODY MASS INDEX DOCD: CPT | Performed by: INTERNAL MEDICINE

## 2024-10-07 PROCEDURE — 99214 OFFICE O/P EST MOD 30 MIN: CPT | Performed by: INTERNAL MEDICINE

## 2024-10-07 PROCEDURE — 3078F DIAST BP <80 MM HG: CPT | Performed by: INTERNAL MEDICINE

## 2024-10-07 PROCEDURE — 83036 HEMOGLOBIN GLYCOSYLATED A1C: CPT | Performed by: INTERNAL MEDICINE

## 2024-10-07 PROCEDURE — 85027 COMPLETE CBC AUTOMATED: CPT

## 2024-10-07 NOTE — PROGRESS NOTES
Subjective   Patient ID: Jazlyn Dean is a 48 y.o. female who presents for the following    Physical 10/7/2024 and follow up for left shoulder and diabetes uncontrolled     Assessment/Plan   HX OF j-j stent placement with stone removal, Dr Asim GOULD: check cmp    Vertigo: recommend PT in the future    Diabetes type 2: a1c  6.1 4/3/23 --> 6.4  10/3/23--> 11.2 (10/7/24)  Continue on  trulicity- 1.5--> 3mg (10/7/24)  Jardiance 10mg daily 10/7/2024  Patient is 25lbs over weight from 1 year ago.   She has a glucose meter but is not using it.   (Metformin caused confusion and sleeping)    Left shoulder: ROM is slightly limited with internal rotation behind her back. left shoulder xray   A steroid injection is contraindicated given her uncontrolled diabetes  Tyelnol   Patient defers physical therpay at this time    Htn: stable    HLD: stable, continue on atorvastatin     Hx uterine cancer stage 1: resolved, follows with dr kent     Spinal stenosis s/p surgery follows with dr benson. Lots of pain. Refer to pain management   40mg depot medrol IM x 1    Rheumatoid arthritis: patient following with dr moore. Recommend to look into enbrel     Mammogram order was all ready given     Defers all colon cancer screening     Follow up with GYN         HPI  female here for the following      GOULD: GI felt during October 2022 hospitalization that her elevated LFTS were secondary to GOULD.       Diabetes Type 2: patient denies any low blood sugars. Patient is following with opthalmology on a yearly basis. Patient is stopped trulicity back in April as the medication was not avaiable. She resumed the medication by July. In that time she gained 25lbs.      metformin caused GI upset and diarrhea     patient is tolerating trulicity doing well now     Rheumatoid arthritis: multi joint pain including shoulder pain today she wants an xray.      HTN: patient denies any headaches, blurred vision or dizziness. patient denies any stroke  "like symptoms     HLD: she did not start statin      hx uterine cancer: stage 1, with Dr Brii Cruzflorencio had total hysterectomy august 2021. she was lost to oncology follow up      patient was without insurance for a while      current smoker      HOSPITALIZATIONS  patient was discharge october 23, 2022 from [facility] for left-sided flank pain started approximately 2 days ago. Patient says she was doing all right over the weekend up until this morning. Patient says that she started to feel short of breath pain seem to have worsened and she just did not feel right. So she went to the emergency department for further assessment. She is becoming more more weak. Patient is noted to have creatinine of 1.7 upon admission a white count of 15.6K with shift, significant urinary tract infections with blood cultures positive with gram-negative rods. CT abdomen pelvis shows mild left hydroureteronephrosis 3mm in the distal left ureter . Given patient's low blood pressures and clinical course urology had a double-J stent placed Patient did leave the hospital AGAINST MEDICAL ADVICE. Her kidney function was rising and she had electrolyte abnormalities in the setting of bacteremia with cultures that were not yet with susceptibilities. I did call her and [employer]      patient then did come back to the hospital and she was then readmitted october 26, 2022. her urine cultures grew out klebsiella. Patient was discharged on another 10 days of cipro.     Visit Vitals  /64 (BP Location: Right arm, Patient Position: Sitting, BP Cuff Size: Adult)   Pulse 107   Ht 1.676 m (5' 6\")   Wt 118 kg (260 lb)   SpO2 98%   BMI 41.97 kg/m²   Smoking Status Former   BSA 2.34 m²     PHYSICAL EXAM   General appearance: Alert and in no acute distress. speech is clear and coherent  HEENT: Sclera and conjunctiva white, EOMI, uvela midline, no mouth lesions. PERRLA,  nasal turbinates are not swollen without exudate. TM's Booth with cone of light, external ear " canal with scant cerumen. No head trauma  Neck: no carotid bruits or thyromegaly. no lymphadenopathy   Respiratory : No respiratory distress, normal respiratory rhythm and effort. Clear bilateral breath sounds. No wheezing or rhonchi.   Cardiovascular: heart rate regular, S1, S2. no murmurs. no Lower extremity edema  Skin inspection: Normal skin color and pigmentation, normal skin turgor and no visible rash, induration, or cellulitis  MSK: 5/5 strength upper and lower extremities without gait abnormalities. no loss of muscle mass . ROM is slightly limited on the left shoulder with internal rotation behind her back. Other wise left shoulder had normal ROM  Neuro: 2-12 CN grossly intact.  no slurred speech. no lateralizing deficit  Psychiatric Orientation: Oriented to person, place, and time. no depression, homicidal or suicidal thoughts, normal affect  Abdomen: soft, none tender, none distended. no organomegaly      REVIEW OF SYSTEMS     Constitutional: not feeling tired and no fever, chills or sweats. Denies weight loss    HEENT: no earache and no sore throat. no blurred vision and or double vision. no headache  Cardiovascular: no exertional chest pain, no palpitations, no lower extremity edema and no intermittent leg claudication.   Lungs: Denies shortness of breath, exertional dyspnea, wheezing  Gastrointestinal: no change in bowel habits, no diarrhea, no nausea, no vomiting and no abdominal pain. Denies Melena, brbpr or dark stool  Musculoskeletal: no myalgias, no muscle weakness and no limb swelling.   Skin: no rashes, no change in skin color and pigmentation and no skin lumps.   Neurological: no headaches, no seizures, no numbness, no lateralizing deficits and no fainting.   Psychiatric: no depression and no anxiety.   Urine: denies polyuria, hematuria, dysuria   Endocrine: no cold intolerance, no heat intolerance    Allergies   Allergen Reactions    Aspirin Unknown    Latex Unknown    Metformin Unknown        Current Outpatient Medications   Medication Sig Dispense Refill    dulaglutide (Trulicity) 1.5 mg/0.5 mL pen injector injection Inject 1.5 mg under the skin 1 (one) time per week. 2 mL 3    amitriptyline (Elavil) 10 mg tablet Take 1 tablet (10 mg) by mouth once daily.      atorvastatin (Lipitor) 10 mg tablet Take 1 tablet (10 mg) by mouth once daily. 30 tablet 5    diclofenac (Voltaren) 75 mg EC tablet Take 1 tablet (75 mg) by mouth 2 times a day as needed.      folic acid (Folvite) 1 mg tablet Take by mouth once daily.      hydroxychloroquine (Plaquenil) 200 mg tablet Take 1 tablet (200 mg) by mouth 2 times a day.      losartan (Cozaar) 25 mg tablet Take 1 tablet (25 mg) by mouth once daily.      naproxen (Naprosyn) 500 mg tablet Take 1 tablet (500 mg) by mouth 2 times a day as needed.      ondansetron (Zofran) 8 mg tablet Take 1 tablet (8 mg) by mouth every 8 hours if needed for nausea.      phenazopyridine (Pyridium) 100 mg tablet Take 1 tablet (100 mg) by mouth 2 times a day.      tamsulosin (Flomax) 0.4 mg 24 hr capsule Take 1 capsule (0.4 mg) by mouth once daily.       No current facility-administered medications for this visit.       Objective     No visits with results within 4 Month(s) from this visit.   Latest known visit with results is:   Lab on 10/03/2023   Component Date Value Ref Range Status    WBC 10/03/2023 8.3  4.4 - 11.3 x10*3/uL Final    nRBC 10/03/2023 0.0  0.0 - 0.0 /100 WBCs Final    RBC 10/03/2023 4.33  4.00 - 5.20 x10*6/uL Final    Hemoglobin 10/03/2023 14.0  12.0 - 16.0 g/dL Final    Hematocrit 10/03/2023 43.3  36.0 - 46.0 % Final    MCV 10/03/2023 100  80 - 100 fL Final    MCH 10/03/2023 32.3  26.0 - 34.0 pg Final    MCHC 10/03/2023 32.3  32.0 - 36.0 g/dL Final    RDW 10/03/2023 13.4  11.5 - 14.5 % Final    Platelets 10/03/2023 311  150 - 450 x10*3/uL Final    MPV 10/03/2023 9.0  7.5 - 11.5 fL Final    Thyroid Stimulating Hormone 10/03/2023 2.66  0.44 - 3.98 mIU/L Final    Vitamin  D, 25-Hydroxy, Total 10/03/2023 32  30 - 100 ng/mL Final       Radiology: Reviewed imaging in powerchart.  No results found.    Family History   Problem Relation Name Age of Onset    Other (cardiac problem) Mother      Other (diabetes mellitus) Mother       Social History     Socioeconomic History    Marital status: Single   Tobacco Use    Smoking status: Former     Current packs/day: 0.25     Types: Cigarettes    Smokeless tobacco: Never   Substance and Sexual Activity    Alcohol use: Yes     Comment: occassionally    Drug use: Never     Past Medical History:   Diagnosis Date    Acute tonsillitis, unspecified 12/12/2016    Acute tonsillitis    Impacted cerumen, left ear 08/30/2016    Impacted cerumen of left ear    Personal history of other diseases of the nervous system and sense organs     History of migraine    Personal history of other diseases of the respiratory system     History of bronchitis    Personal history of other diseases of the respiratory system 10/03/2016    History of acute pharyngitis    Personal history of other diseases of the respiratory system 08/30/2016    History of acute pharyngitis    Personal history of other infectious and parasitic diseases     History of varicella    Personal history of other specified conditions     History of insomnia    Personal history of other specified conditions 11/11/2022    History of chest pain    Pneumonia, unspecified organism     Walking pneumonia     Past Surgical History:   Procedure Laterality Date    BACK SURGERY  02/12/2015    Back Surgery    MOUTH SURGERY  02/12/2015    Oral Surgery Tooth Extraction       Charting was completed using voice recognition technology and may include unintended errors.

## 2024-10-10 ENCOUNTER — TELEPHONE (OUTPATIENT)
Dept: PRIMARY CARE | Facility: CLINIC | Age: 49
End: 2024-10-10
Payer: COMMERCIAL

## 2024-10-10 NOTE — TELEPHONE ENCOUNTER
Lvmtcb   Left shoulder xray shows some arthritic changes   Patient;s LDL (bad cholesterol) is very  elevated. Is she taking her atorvastatin? If she is not she Is advised to do so. If she is she will need a dose increase. Let me know. Thanks!

## 2024-10-10 NOTE — TELEPHONE ENCOUNTER
----- Message from Wili Adams sent at 10/10/2024  7:23 AM EDT -----  Left shoulder xray shows some arthritic changes

## 2024-10-22 DIAGNOSIS — E78.5 HYPERLIPIDEMIA, UNSPECIFIED HYPERLIPIDEMIA TYPE: ICD-10-CM

## 2024-10-22 DIAGNOSIS — I10 HYPERTENSION, UNSPECIFIED TYPE: ICD-10-CM

## 2024-10-23 DIAGNOSIS — E11.8 CONTROLLED DIABETES MELLITUS TYPE 2 WITH COMPLICATIONS, UNSPECIFIED WHETHER LONG TERM INSULIN USE (MULTI): ICD-10-CM

## 2024-10-23 RX ORDER — DEXTROSE 4 G
TABLET,CHEWABLE ORAL
Qty: 1 EACH | Refills: 0 | Status: SHIPPED | OUTPATIENT
Start: 2024-10-23

## 2024-10-23 RX ORDER — LANCETS
EACH MISCELLANEOUS
Qty: 100 EACH | Refills: 3 | Status: SHIPPED | OUTPATIENT
Start: 2024-10-23

## 2024-10-23 RX ORDER — ATORVASTATIN CALCIUM 10 MG/1
10 TABLET, FILM COATED ORAL DAILY
Qty: 90 TABLET | Refills: 3 | Status: SHIPPED | OUTPATIENT
Start: 2024-10-23 | End: 2025-10-18

## 2024-10-23 RX ORDER — LANCETS 26 GAUGE
EACH MISCELLANEOUS
Qty: 1 EACH | Refills: 3 | Status: SHIPPED | OUTPATIENT
Start: 2024-10-23 | End: 2025-10-23

## 2025-01-06 ENCOUNTER — TELEPHONE (OUTPATIENT)
Dept: PRIMARY CARE | Facility: CLINIC | Age: 50
End: 2025-01-06
Payer: COMMERCIAL

## 2025-01-07 DIAGNOSIS — E11.8 CONTROLLED TYPE 2 DIABETES MELLITUS WITH COMPLICATION, WITHOUT LONG-TERM CURRENT USE OF INSULIN (MULTI): ICD-10-CM

## 2025-01-08 ENCOUNTER — APPOINTMENT (OUTPATIENT)
Dept: PRIMARY CARE | Facility: CLINIC | Age: 50
End: 2025-01-08
Payer: COMMERCIAL

## 2025-01-09 ENCOUNTER — TELEPHONE (OUTPATIENT)
Dept: PRIMARY CARE | Facility: CLINIC | Age: 50
End: 2025-01-09
Payer: COMMERCIAL

## 2025-01-09 NOTE — TELEPHONE ENCOUNTER
Close reason: The  is not the PA processor for this patient and medication combination.   Note from payer: [employer] does not handle this review. Please contact [employer] at Phone: [phone] or Fax: [phone]  Suggested payer for the patient: [email or web address]         dulaglutide (Trulicity) 3 mg/0.5 mL injection       Needs PA on this   Been out of it for now going on 2 weeks     Can you please call insurance..

## 2025-04-01 DIAGNOSIS — E11.8 CONTROLLED TYPE 2 DIABETES MELLITUS WITH COMPLICATION, WITHOUT LONG-TERM CURRENT USE OF INSULIN (MULTI): ICD-10-CM

## 2025-04-01 RX ORDER — EMPAGLIFLOZIN 10 MG/1
10 TABLET, FILM COATED ORAL DAILY
Qty: 90 TABLET | Refills: 3 | Status: SHIPPED | OUTPATIENT
Start: 2025-04-01